# Patient Record
Sex: MALE | Race: WHITE
[De-identification: names, ages, dates, MRNs, and addresses within clinical notes are randomized per-mention and may not be internally consistent; named-entity substitution may affect disease eponyms.]

---

## 2019-02-26 LAB
ALBUMIN SERPL BCP-MCNC: 3.9 G/DL (ref 3.4–5)
ANION GAP SERPL CALCULATED.3IONS-SCNC: 7 MMOL/L (ref 8–16)
APTT PPP: 29 SECONDS (ref 22–32)
BASOPHILS # BLD AUTO: 0 X10'3 (ref 0–0.2)
BASOPHILS NFR BLD AUTO: 0.6 % (ref 0–1)
BUN SERPL-MCNC: 27 MG/DL (ref 7–18)
BUN/CREAT SERPL: 20.8 (ref 5.4–32)
CALCIUM SERPL-MCNC: 9.2 MG/DL (ref 8.5–10.1)
CHLORIDE SERPL-SCNC: 101 MMOL/L (ref 99–107)
CO2 SERPL-SCNC: 27.3 MMOL/L (ref 24–32)
CREAT SERPL-MCNC: 1.3 MG/DL (ref 0.6–1.1)
EOSINOPHIL # BLD AUTO: 0.2 X10'3 (ref 0–0.9)
EOSINOPHIL NFR BLD AUTO: 2.7 % (ref 0–6)
ERYTHROCYTE [DISTWIDTH] IN BLOOD BY AUTOMATED COUNT: 14.5 % (ref 11.5–14.5)
GFR SERPL CREATININE-BSD FRML MDRD: 53 ML/MIN
GLUCOSE SERPL-MCNC: 104 MG/DL (ref 70–104)
HCT VFR BLD AUTO: 41.7 % (ref 42–52)
HGB BLD-MCNC: 14 G/DL (ref 14–17.9)
INR PPP: 1 INR
LYMPHOCYTES # BLD AUTO: 1.6 X10'3 (ref 1.1–4.8)
LYMPHOCYTES NFR BLD AUTO: 21.2 % (ref 21–51)
MCH RBC QN AUTO: 31.1 PG (ref 27–31)
MCHC RBC AUTO-ENTMCNC: 33.6 G/DL (ref 33–36.5)
MCV RBC AUTO: 92.7 FL (ref 78–98)
MONOCYTES # BLD AUTO: 0.8 X10'3 (ref 0–0.9)
MONOCYTES NFR BLD AUTO: 11.1 % (ref 2–12)
NEUTROPHILS # BLD AUTO: 4.8 X10'3 (ref 1.8–7.7)
NEUTROPHILS NFR BLD AUTO: 64.4 % (ref 42–75)
PLATELET # BLD AUTO: 347 X10'3 (ref 140–440)
PMV BLD AUTO: 6.7 FL (ref 7.4–10.4)
POTASSIUM SERPL-SCNC: 4.9 MMOL/L (ref 3.5–5.1)
PROTHROMBIN TIME: 10.4 SECONDS (ref 9–12)
RBC # BLD AUTO: 4.5 X10'6 (ref 4.7–6.1)
SODIUM SERPL-SCNC: 135 MMOL/L (ref 135–145)
WBC # BLD AUTO: 7.4 X10'3 (ref 4.5–11)

## 2019-02-27 ENCOUNTER — HOSPITAL ENCOUNTER (OUTPATIENT)
Dept: HOSPITAL 94 - SSTAY O | Age: 84
Discharge: HOME | End: 2019-02-27
Attending: INTERNAL MEDICINE
Payer: MEDICARE

## 2019-02-27 VITALS — DIASTOLIC BLOOD PRESSURE: 59 MMHG | SYSTOLIC BLOOD PRESSURE: 142 MMHG

## 2019-02-27 VITALS — SYSTOLIC BLOOD PRESSURE: 154 MMHG | DIASTOLIC BLOOD PRESSURE: 68 MMHG

## 2019-02-27 VITALS — SYSTOLIC BLOOD PRESSURE: 147 MMHG | DIASTOLIC BLOOD PRESSURE: 62 MMHG

## 2019-02-27 VITALS — SYSTOLIC BLOOD PRESSURE: 158 MMHG | DIASTOLIC BLOOD PRESSURE: 67 MMHG

## 2019-02-27 VITALS — HEIGHT: 68 IN | WEIGHT: 223.55 LBS | BODY MASS INDEX: 33.88 KG/M2

## 2019-02-27 VITALS — SYSTOLIC BLOOD PRESSURE: 147 MMHG | DIASTOLIC BLOOD PRESSURE: 63 MMHG

## 2019-02-27 VITALS — DIASTOLIC BLOOD PRESSURE: 63 MMHG | SYSTOLIC BLOOD PRESSURE: 161 MMHG

## 2019-02-27 VITALS — DIASTOLIC BLOOD PRESSURE: 64 MMHG | SYSTOLIC BLOOD PRESSURE: 149 MMHG

## 2019-02-27 VITALS — SYSTOLIC BLOOD PRESSURE: 144 MMHG | DIASTOLIC BLOOD PRESSURE: 81 MMHG

## 2019-02-27 VITALS — SYSTOLIC BLOOD PRESSURE: 152 MMHG | DIASTOLIC BLOOD PRESSURE: 56 MMHG

## 2019-02-27 VITALS — SYSTOLIC BLOOD PRESSURE: 146 MMHG | DIASTOLIC BLOOD PRESSURE: 63 MMHG

## 2019-02-27 DIAGNOSIS — I50.9: ICD-10-CM

## 2019-02-27 DIAGNOSIS — I25.10: Primary | ICD-10-CM

## 2019-02-27 DIAGNOSIS — E66.9: ICD-10-CM

## 2019-02-27 DIAGNOSIS — E78.5: ICD-10-CM

## 2019-02-27 DIAGNOSIS — I11.0: ICD-10-CM

## 2019-02-27 DIAGNOSIS — Z95.5: ICD-10-CM

## 2019-02-27 PROCEDURE — 85025 COMPLETE CBC W/AUTO DIFF WBC: CPT

## 2019-02-27 PROCEDURE — 93005 ELECTROCARDIOGRAM TRACING: CPT

## 2019-02-27 PROCEDURE — 85610 PROTHROMBIN TIME: CPT

## 2019-02-27 PROCEDURE — 36415 COLL VENOUS BLD VENIPUNCTURE: CPT

## 2019-02-27 PROCEDURE — 85014 HEMATOCRIT: CPT

## 2019-02-27 PROCEDURE — 99153 MOD SED SAME PHYS/QHP EA: CPT

## 2019-02-27 PROCEDURE — 85730 THROMBOPLASTIN TIME PARTIAL: CPT

## 2019-02-27 PROCEDURE — 99152 MOD SED SAME PHYS/QHP 5/>YRS: CPT

## 2019-02-27 PROCEDURE — 80048 BASIC METABOLIC PNL TOTAL CA: CPT

## 2019-02-27 PROCEDURE — 82803 BLOOD GASES ANY COMBINATION: CPT

## 2019-02-27 PROCEDURE — 93460 R&L HRT ART/VENTRICLE ANGIO: CPT

## 2019-02-28 LAB
HCT VFR BLD CALC: 39 %PCV (ref 42–52)
HCT VFR BLD CALC: 39 %PCV (ref 42–52)
HGB BLD CALC-MCNC: 13.3 G/DL (ref 14–18)
SAO2 % BLDA FROM PO2: 95 % (ref 95–98)
SAO2 % BLDV FROM PO2: 66 % (ref 60–80)
SPECIMEN SOURCE: (no result)

## 2019-06-19 LAB
ALBUMIN SERPL BCP-MCNC: 3.3 G/DL (ref 3.4–5)
ANION GAP SERPL CALCULATED.3IONS-SCNC: 7 MMOL/L (ref 8–16)
BASOPHILS # BLD AUTO: 0 X10'3 (ref 0–0.2)
BASOPHILS NFR BLD AUTO: 0.5 % (ref 0–1)
BUN SERPL-MCNC: 26 MG/DL (ref 7–18)
BUN/CREAT SERPL: 25.5 (ref 5.4–32)
CALCIUM SERPL-MCNC: 9.6 MG/DL (ref 8.5–10.1)
CHLORIDE SERPL-SCNC: 105 MMOL/L (ref 99–107)
CO2 SERPL-SCNC: 28 MMOL/L (ref 24–32)
CREAT SERPL-MCNC: 1.02 MG/DL (ref 0.6–1.1)
EOSINOPHIL # BLD AUTO: 0.2 X10'3 (ref 0–0.9)
EOSINOPHIL NFR BLD AUTO: 2.9 % (ref 0–6)
ERYTHROCYTE [DISTWIDTH] IN BLOOD BY AUTOMATED COUNT: 13.8 % (ref 11.5–14.5)
GFR SERPL CREATININE-BSD FRML MDRD: 70 ML/MIN
GLUCOSE SERPL-MCNC: 104 MG/DL (ref 70–104)
HCT VFR BLD AUTO: 38.8 % (ref 42–52)
HGB BLD-MCNC: 12.8 G/DL (ref 14–17.9)
LYMPHOCYTES # BLD AUTO: 1.5 X10'3 (ref 1.1–4.8)
LYMPHOCYTES NFR BLD AUTO: 21.1 % (ref 21–51)
MCH RBC QN AUTO: 29.3 PG (ref 27–31)
MCHC RBC AUTO-ENTMCNC: 33 G/DL (ref 33–36.5)
MCV RBC AUTO: 88.8 FL (ref 78–98)
MONOCYTES # BLD AUTO: 0.7 X10'3 (ref 0–0.9)
MONOCYTES NFR BLD AUTO: 10.1 % (ref 2–12)
NEUTROPHILS # BLD AUTO: 4.6 X10'3 (ref 1.8–7.7)
NEUTROPHILS NFR BLD AUTO: 65.4 % (ref 42–75)
PLATELET # BLD AUTO: 346 X10'3 (ref 140–440)
PMV BLD AUTO: 6.6 FL (ref 7.4–10.4)
POTASSIUM SERPL-SCNC: 4 MMOL/L (ref 3.5–5.1)
RBC # BLD AUTO: 4.37 X10'6 (ref 4.7–6.1)
SODIUM SERPL-SCNC: 140 MMOL/L (ref 135–145)
WBC # BLD AUTO: 7 X10'3 (ref 4.5–11)

## 2019-06-20 ENCOUNTER — HOSPITAL ENCOUNTER (OUTPATIENT)
Dept: HOSPITAL 94 - SSTAY O | Age: 84
Discharge: HOME | End: 2019-06-20
Attending: INTERNAL MEDICINE
Payer: MEDICARE

## 2019-06-20 VITALS — DIASTOLIC BLOOD PRESSURE: 50 MMHG | SYSTOLIC BLOOD PRESSURE: 106 MMHG

## 2019-06-20 VITALS — SYSTOLIC BLOOD PRESSURE: 102 MMHG | DIASTOLIC BLOOD PRESSURE: 45 MMHG

## 2019-06-20 VITALS — WEIGHT: 208.12 LBS | BODY MASS INDEX: 31.54 KG/M2 | HEIGHT: 68 IN

## 2019-06-20 VITALS — SYSTOLIC BLOOD PRESSURE: 99 MMHG | DIASTOLIC BLOOD PRESSURE: 46 MMHG

## 2019-06-20 VITALS — SYSTOLIC BLOOD PRESSURE: 111 MMHG | DIASTOLIC BLOOD PRESSURE: 53 MMHG

## 2019-06-20 VITALS — DIASTOLIC BLOOD PRESSURE: 47 MMHG | SYSTOLIC BLOOD PRESSURE: 102 MMHG

## 2019-06-20 VITALS — SYSTOLIC BLOOD PRESSURE: 111 MMHG | DIASTOLIC BLOOD PRESSURE: 44 MMHG

## 2019-06-20 VITALS — SYSTOLIC BLOOD PRESSURE: 99 MMHG | DIASTOLIC BLOOD PRESSURE: 51 MMHG

## 2019-06-20 VITALS — DIASTOLIC BLOOD PRESSURE: 51 MMHG | SYSTOLIC BLOOD PRESSURE: 99 MMHG

## 2019-06-20 VITALS — SYSTOLIC BLOOD PRESSURE: 101 MMHG | DIASTOLIC BLOOD PRESSURE: 55 MMHG

## 2019-06-20 VITALS — DIASTOLIC BLOOD PRESSURE: 48 MMHG | SYSTOLIC BLOOD PRESSURE: 108 MMHG

## 2019-06-20 VITALS — SYSTOLIC BLOOD PRESSURE: 88 MMHG | DIASTOLIC BLOOD PRESSURE: 41 MMHG

## 2019-06-20 VITALS — SYSTOLIC BLOOD PRESSURE: 100 MMHG | DIASTOLIC BLOOD PRESSURE: 55 MMHG

## 2019-06-20 DIAGNOSIS — E78.5: ICD-10-CM

## 2019-06-20 DIAGNOSIS — I42.0: ICD-10-CM

## 2019-06-20 DIAGNOSIS — I50.22: ICD-10-CM

## 2019-06-20 DIAGNOSIS — I48.91: Primary | ICD-10-CM

## 2019-06-20 DIAGNOSIS — I49.5: ICD-10-CM

## 2019-06-20 DIAGNOSIS — Z86.711: ICD-10-CM

## 2019-06-20 DIAGNOSIS — I48.92: ICD-10-CM

## 2019-06-20 PROCEDURE — 93005 ELECTROCARDIOGRAM TRACING: CPT

## 2019-06-20 PROCEDURE — 85025 COMPLETE CBC W/AUTO DIFF WBC: CPT

## 2019-06-20 PROCEDURE — 36415 COLL VENOUS BLD VENIPUNCTURE: CPT

## 2019-06-20 PROCEDURE — 92960 CARDIOVERSION ELECTRIC EXT: CPT

## 2019-06-20 PROCEDURE — 80048 BASIC METABOLIC PNL TOTAL CA: CPT

## 2019-06-20 PROCEDURE — 94760 N-INVAS EAR/PLS OXIMETRY 1: CPT

## 2019-06-20 PROCEDURE — 85610 PROTHROMBIN TIME: CPT

## 2019-06-20 NOTE — NUR
LIGHT SHAVE OF ANTERIOR CHEST 

-------------------------------------------------------------------------------

Addendum: 06/20/19 at 1747 by Cassidy Goff RN

-------------------------------------------------------------------------------

Amended: Links added.